# Patient Record
Sex: FEMALE | Race: WHITE | NOT HISPANIC OR LATINO | Employment: FULL TIME | URBAN - METROPOLITAN AREA
[De-identification: names, ages, dates, MRNs, and addresses within clinical notes are randomized per-mention and may not be internally consistent; named-entity substitution may affect disease eponyms.]

---

## 2018-03-01 ENCOUNTER — HOSPITAL ENCOUNTER (EMERGENCY)
Facility: OTHER | Age: 23
Discharge: HOME | End: 2018-03-01
Attending: EMERGENCY MEDICINE
Payer: COMMERCIAL

## 2018-03-01 VITALS
HEART RATE: 76 BPM | DIASTOLIC BLOOD PRESSURE: 69 MMHG | WEIGHT: 165 LBS | TEMPERATURE: 98 F | BODY MASS INDEX: 26.52 KG/M2 | RESPIRATION RATE: 18 BRPM | OXYGEN SATURATION: 100 % | SYSTOLIC BLOOD PRESSURE: 120 MMHG | HEIGHT: 66 IN

## 2018-03-01 DIAGNOSIS — S41.112A LACERATION OF LEFT UPPER EXTREMITY, INITIAL ENCOUNTER: Primary | ICD-10-CM

## 2018-03-01 PROCEDURE — 12002 RPR S/N/AX/GEN/TRNK2.6-7.5CM: CPT

## 2018-03-01 PROCEDURE — 25000003 PHARM REV CODE 250: Performed by: PHYSICIAN ASSISTANT

## 2018-03-01 PROCEDURE — 99283 EMERGENCY DEPT VISIT LOW MDM: CPT | Mod: 25

## 2018-03-01 PROCEDURE — 63600175 PHARM REV CODE 636 W HCPCS: Performed by: PHYSICIAN ASSISTANT

## 2018-03-01 PROCEDURE — 90715 TDAP VACCINE 7 YRS/> IM: CPT | Performed by: PHYSICIAN ASSISTANT

## 2018-03-01 PROCEDURE — 90471 IMMUNIZATION ADMIN: CPT | Performed by: PHYSICIAN ASSISTANT

## 2018-03-01 RX ORDER — LIDOCAINE HYDROCHLORIDE AND EPINEPHRINE 20; 10 MG/ML; UG/ML
10 INJECTION, SOLUTION INFILTRATION; PERINEURAL
Status: COMPLETED | OUTPATIENT
Start: 2018-03-01 | End: 2018-03-01

## 2018-03-01 RX ORDER — TRAZODONE HYDROCHLORIDE 100 MG/1
100 TABLET ORAL NIGHTLY
COMMUNITY

## 2018-03-01 RX ADMIN — LIDOCAINE HYDROCHLORIDE,EPINEPHRINE BITARTRATE 10 ML: 20; .01 INJECTION, SOLUTION INFILTRATION; PERINEURAL at 08:03

## 2018-03-01 RX ADMIN — BACITRACIN, NEOMYCIN, POLYMYXIN B 1 EACH: 400; 3.5; 5 OINTMENT TOPICAL at 08:03

## 2018-03-01 RX ADMIN — CLOSTRIDIUM TETANI TOXOID ANTIGEN (FORMALDEHYDE INACTIVATED), CORYNEBACTERIUM DIPHTHERIAE TOXOID ANTIGEN (FORMALDEHYDE INACTIVATED), BORDETELLA PERTUSSIS TOXOID ANTIGEN (GLUTARALDEHYDE INACTIVATED), BORDETELLA PERTUSSIS FILAMENTOUS HEMAGGLUTININ ANTIGEN (FORMALDEHYDE INACTIVATED), BORDETELLA PERTUSSIS PERTACTIN ANTIGEN, AND BORDETELLA PERTUSSIS FIMBRIAE 2/3 ANTIGEN 0.5 ML: 5; 2; 2.5; 5; 3; 5 INJECTION, SUSPENSION INTRAMUSCULAR at 08:03

## 2018-03-02 NOTE — ED PROVIDER NOTES
"Encounter Date: 3/1/2018       History     Chief Complaint   Patient presents with    Laceration     Pt is a cutter and usually cuts very shallow, but today accidentily cut to deep to left wrist, pt is tearfull and denies SI/HI and has an appt to see her therapist this weekend     Patient is a 22-year-old female presenting to the emergency department with complaints of laceration.  The patient reports that earlier this afternoon she had an episode of "cutting" when she used a razor blade to cut her left forearm.  She admits she has done this in the past, previously in November.  She states she used to have a long-standing history of this, but had been in remission previously for 3 years.  She states that she had to stay home from work today as she was sitting, and felt as though she got her at home.  She states that she broke a disposable razor.  She reports that she immediately needed that she went to deep and came to the emergency department.  She denies any suicidal or homicidal ideation.  She states that she used to use this in the past relief distress, reporting she has not been suicidal since her senior high school.  She states not plan to do this to harm herself seriously.  She admits that she is on trazodone for depression.  She states that she sees both a psychiatrist as well as a therapist, and has upcoming appointments on both Saturday and Sunday to see both of them.  She feels she can wait long enough to see them.  She reports she was alone, but has plans to stay with a friend until then.  Patient admits that she feels as though she relapsed due to increased stress in her life.  She denies any particular stressor.       The history is provided by the patient.     Review of patient's allergies indicates:  No Known Allergies  Past Medical History:   Diagnosis Date    Anxiety     Depression     IBS (irritable bowel syndrome)      Past Surgical History:   Procedure Laterality Date    colonoscopy      " GASTROSCOPY       No family history on file.  Social History   Substance Use Topics    Smoking status: Never Smoker    Smokeless tobacco: Never Used    Alcohol use Yes      Comment: occasionally     Review of Systems   Constitutional: Negative for activity change, appetite change, chills, fatigue and fever.   HENT: Negative for congestion, rhinorrhea and sore throat.    Eyes: Negative for photophobia and visual disturbance.   Respiratory: Negative for cough, shortness of breath and wheezing.    Cardiovascular: Negative for chest pain.   Gastrointestinal: Negative for abdominal pain, diarrhea, nausea and vomiting.   Genitourinary: Negative for dysuria, hematuria and urgency.   Musculoskeletal: Negative for back pain, myalgias and neck pain.   Skin: Positive for wound. Negative for color change.   Neurological: Negative for weakness and headaches.   Psychiatric/Behavioral: Negative for agitation and confusion.       Physical Exam     Initial Vitals [03/01/18 1856]   BP Pulse Resp Temp SpO2   (!) 141/98 (!) 120 18 98.6 °F (37 °C) 97 %      MAP       112.33         Physical Exam    Nursing note and vitals reviewed.  Constitutional: She appears well-developed and well-nourished. She is not diaphoretic. No distress.   HENT:   Head: Normocephalic and atraumatic.   Right Ear: External ear normal.   Left Ear: External ear normal.   Nose: Nose normal.   Mouth/Throat: Oropharynx is clear and moist.   Eyes: Conjunctivae and EOM are normal.   Neck: Normal range of motion. Neck supple.   Musculoskeletal: Normal range of motion.   Neurological: She is alert and oriented to person, place, and time.   Skin: Skin is warm.        Psychiatric: She has a normal mood and affect. Her behavior is normal. Judgment and thought content normal.   Well-groomed, appropriately dressed,  female.  She is speaking in clear and full sentences.  She denies suicidal or homicidal ideation.         ED Course   Lac Repair  Date/Time: 3/1/2018  10:03 PM  Performed by: DINESH SMALLS  Authorized by: LOURDES BRAR   Body area: upper extremity  Location details: left lower arm  Laceration length: 4 cm  Foreign bodies: no foreign bodies  Tendon involvement: none  Nerve involvement: none  Anesthesia: local infiltration    Anesthesia:  Local Anesthetic: lidocaine 2% with epinephrine  Anesthetic total: 1 mL  Preparation: Patient was prepped and draped in the usual sterile fashion.  Irrigation solution: saline  Irrigation method: syringe  Debridement: none  Degree of undermining: none  Skin closure: 4-0 nylon  Number of sutures: 5  Technique: simple  Approximation: close  Approximation difficulty: simple  Dressing: antibiotic ointment and 4x4 sterile gauze  Patient tolerance: Patient tolerated the procedure well with no immediate complications        Labs Reviewed   POCT URINE PREGNANCY             Medical Decision Making:   Initial Assessment:   Urgent evaluation of a 22-year-old female presenting with complaints of a self-inflicted laceration to her left forearm.  Patient is afebrile, nontoxic appearing, hemodynamically stable.  Physical exam reveals a 4 cm gaping laceration with no deep tissue or tendon involvement.  Will plan for wound care, laceration repair, and reassess.  I did have a likely discussion with the patient in regards to her mental health.  She admits she has a history of both depression and anxiety for which she is treated trazodone.  She adamantly denies suicidal or homicidal ideation.  She has good insight, stating she is remorseful for her actions and was scared with what she did.  She has arranged to spend the next few nights at a friend's house, and has close follow-up with both therapist and psychiatrist.  I do not feel that a PEC is warranted at this time.  Clinical Tests:   Lab Tests: Ordered and Reviewed  The following lab test(s) were unremarkable: UPT  ED Management:  Laceration was cleaned extensively in the emergency department  and repaired per procedure note, the patient tolerated this well.  At this time, do not feel that further testing or imaging is warranted.  Patient is going home to the care of her friend who will stay with her for the next few nights.  Patient states she feels safe at home.  No further thoughts of harming herself.  Counseled on symptomatic care and treatment including wound care and suture removal.  Stable for discharge. The patient was instructed to follow up with a primary care provider in 2 days or to return to the emergency department for worsening symptoms. The treatment plan was discussed with the patient who demonstrated understanding and comfort with plan. The patient's history, physical exam, and plan of care was discussed with and agreed upon with my supervising physician.    Other:   I have discussed this case with another health care provider.       <> Summary of the Discussion: Dr. Orellana  This note was created using Dragon Medical Dictation. There may be typographical errors secondary to dictation.                       Clinical Impression:     1. Laceration of left upper extremity, initial encounter       Disposition:   Disposition: Discharged  Condition: Stable                        Julisa Servin PA-C  03/01/18 2191

## 2018-03-02 NOTE — ED NOTES
"Pt states "I relapsed with my cutting and cut too deep". Pt denies SI. Reports taking trazodone for depression and has an appointment with her psychiatrist to discuss increasing the dosage. Transverse incision on left anterior forearm approximately 5 cm in length with edges not well approximated at center of cut.  "

## 2018-03-06 ENCOUNTER — NURSE TRIAGE (OUTPATIENT)
Dept: ADMINISTRATIVE | Facility: CLINIC | Age: 23
End: 2018-03-06

## 2018-03-06 ENCOUNTER — HOSPITAL ENCOUNTER (EMERGENCY)
Facility: OTHER | Age: 23
Discharge: HOME | End: 2018-03-06
Attending: EMERGENCY MEDICINE
Payer: COMMERCIAL

## 2018-03-06 VITALS
SYSTOLIC BLOOD PRESSURE: 109 MMHG | HEART RATE: 70 BPM | WEIGHT: 165 LBS | OXYGEN SATURATION: 99 % | HEIGHT: 65 IN | DIASTOLIC BLOOD PRESSURE: 61 MMHG | RESPIRATION RATE: 14 BRPM | BODY MASS INDEX: 27.49 KG/M2 | TEMPERATURE: 99 F

## 2018-03-06 DIAGNOSIS — Z51.89 VISIT FOR WOUND CHECK: Primary | ICD-10-CM

## 2018-03-06 PROCEDURE — 99281 EMR DPT VST MAYX REQ PHY/QHP: CPT

## 2018-03-06 RX ORDER — BUPROPION HYDROCHLORIDE 150 MG/1
150 TABLET, EXTENDED RELEASE ORAL 2 TIMES DAILY
COMMUNITY
End: 2018-04-04

## 2018-03-06 NOTE — ED PROVIDER NOTES
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 Pt told to have stitches removed 10-14 days after placement. Wound well approximated. No swelling discharge or redness to area     22-year-old female with anxiety and depression presents emergency department for wound check.  She states that she had a laceration to the left forearm that was sutured approximately 5 days ago.  She states that her work is requiring her to have a note stating that the wound needs to be covered.  She denies any complications, drainage, surrounding erythema, warmth, pain, fever or streaking.      The history is provided by the patient.     Review of patient's allergies indicates:  No Known Allergies  Past Medical History:   Diagnosis Date    Anxiety     Depression     IBS (irritable bowel syndrome)      Past Surgical History:   Procedure Laterality Date    colonoscopy      GASTROSCOPY       No family history on file.  Social History   Substance Use Topics    Smoking status: Never Smoker    Smokeless tobacco: Never Used    Alcohol use Yes      Comment: occasionally     Review of Systems   Constitutional: Negative for chills and fever.   HENT: Negative for sore throat.    Respiratory: Negative for shortness of breath.    Cardiovascular: Negative for chest pain.   Gastrointestinal: Negative for nausea and vomiting.   Genitourinary: Negative for dysuria.   Musculoskeletal: Negative for back pain.   Skin: Positive for wound. Negative for rash.   Neurological: Negative for weakness.   Hematological: Does not bruise/bleed easily.       Physical Exam     Initial Vitals [03/06/18 1614]   BP Pulse Resp Temp SpO2   109/61 70 14 98.8 °F (37.1 °C) 99 %      MAP       77         Physical Exam    Nursing note and vitals reviewed.  Constitutional: Vital signs are " normal. She appears well-developed and well-nourished.  Non-toxic appearance. No distress.   HENT:   Head: Normocephalic and atraumatic.   Right Ear: External ear normal.   Left Ear: External ear normal.   Nose: Nose normal.   Eyes: Conjunctivae and lids are normal. No scleral icterus.   Neck: Normal range of motion and phonation normal. Neck supple.   Abdominal: Soft. Normal appearance.   Musculoskeletal: Normal range of motion.   No obvious deformities, moving all extremities, normal gait   Neurological: She is alert and oriented to person, place, and time. She has normal strength. No sensory deficit.   Skin: Skin is warm and dry. Capillary refill takes less than 2 seconds. Laceration noted. No lesion and no rash noted. No erythema.        Approximate 4 cm laceration with sutures in place.  Healing well however still with wound dehiscence as it is not fully healed.  No surrounding erythema, induration, fluctuance, drainage or tenderness palpation   Psychiatric: She has a normal mood and affect. Her speech is normal and behavior is normal. Judgment normal. Cognition and memory are normal.         ED Course   Procedures  Labs Reviewed - No data to display          Medical Decision Making:   History:   Old Medical Records: I decided to obtain old medical records.  Initial Assessment:   22-year-old female with complaints consistent with laceration to the forearm here for wound check.  Vital signs stable, afebrile, neurovascularly intact.  She is alert, healthy and nontoxic appearing.  She is in no apparent distress.  No focal neurological deficits.  Exam documented above.  No evidence of serious bacterial infection, cellulitis or abscess.  Wound healing with sutures in place.  ED Management:  Patient discharged home with care instructions.  Given a note to return to work stating that she can work with bandage overlying the wound.  Given bandage material.  She is to follow-up as directed for suture removal in the next  5-7 days.  She is urged return sooner for any worsening symptoms.  She states understanding.  This patient was discussed with the attending physician who agrees with treatment plan.    Patient discharged and walked to the discharge window by me, Yesica Mancilla PA-C  Other:   I have discussed this case with another health care provider.       <> Summary of the Discussion: Jodi  This note was created using Dragon Medical dictation.  There may be typographical errors secondary to dictation.                        Clinical Impression:     1. Visit for wound check          Disposition:   Disposition: Discharged  Condition: Stable                        Yesica Mancilla PA-C  03/06/18 1737       Yesica Mancilla PA-C  03/06/18 4771

## 2018-03-06 NOTE — TELEPHONE ENCOUNTER
Reason for Disposition   General information question, no triage required and triager able to answer question    Protocols used: ST INFORMATION ONLY CALL-A-AH    Pt states that she needs a work note stating that it is ok for her to return to work but to keep sutures covered while at work. 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 Dressing recommended while at work. Pt transferred to ED that she was seen at to request work note.

## 2018-03-16 ENCOUNTER — HOSPITAL ENCOUNTER (EMERGENCY)
Facility: OTHER | Age: 23
Discharge: PSYCHIATRIC OR SUBSTANCE USE FACILITY | End: 2018-03-17
Attending: EMERGENCY MEDICINE
Payer: COMMERCIAL

## 2018-03-16 DIAGNOSIS — R45.851 SUICIDAL IDEATIONS: Primary | ICD-10-CM

## 2018-03-16 LAB
ALBUMIN SERPL BCP-MCNC: 3.8 G/DL
ALP SERPL-CCNC: 56 U/L
ALT SERPL W/O P-5'-P-CCNC: 50 U/L
AMPHET+METHAMPHET UR QL: NEGATIVE
ANION GAP SERPL CALC-SCNC: 10 MMOL/L
APAP SERPL-MCNC: <3 UG/ML
AST SERPL-CCNC: 21 U/L
B-HCG UR QL: NEGATIVE
BARBITURATES UR QL SCN>200 NG/ML: NEGATIVE
BASOPHILS # BLD AUTO: 0.08 K/UL
BASOPHILS NFR BLD: 1.2 %
BENZODIAZ UR QL SCN>200 NG/ML: NEGATIVE
BILIRUB SERPL-MCNC: 0.5 MG/DL
BILIRUB UR QL STRIP: NEGATIVE
BUN SERPL-MCNC: 10 MG/DL
BZE UR QL SCN: NEGATIVE
CALCIUM SERPL-MCNC: 9.4 MG/DL
CANNABINOIDS UR QL SCN: NEGATIVE
CHLORIDE SERPL-SCNC: 106 MMOL/L
CLARITY UR: ABNORMAL
CO2 SERPL-SCNC: 20 MMOL/L
COLOR UR: YELLOW
CREAT SERPL-MCNC: 0.8 MG/DL
CREAT UR-MCNC: 309.7 MG/DL
CTP QC/QA: YES
DIFFERENTIAL METHOD: NORMAL
EOSINOPHIL # BLD AUTO: 0.2 K/UL
EOSINOPHIL NFR BLD: 2.4 %
ERYTHROCYTE [DISTWIDTH] IN BLOOD BY AUTOMATED COUNT: 13.6 %
EST. GFR  (AFRICAN AMERICAN): >60 ML/MIN/1.73 M^2
EST. GFR  (NON AFRICAN AMERICAN): >60 ML/MIN/1.73 M^2
ETHANOL SERPL-MCNC: <10 MG/DL
GLUCOSE SERPL-MCNC: 87 MG/DL
GLUCOSE UR QL STRIP: NEGATIVE
HCT VFR BLD AUTO: 37.3 %
HGB BLD-MCNC: 12.5 G/DL
HGB UR QL STRIP: ABNORMAL
KETONES UR QL STRIP: NEGATIVE
LEUKOCYTE ESTERASE UR QL STRIP: NEGATIVE
LYMPHOCYTES # BLD AUTO: 2.5 K/UL
LYMPHOCYTES NFR BLD: 37.2 %
MCH RBC QN AUTO: 29 PG
MCHC RBC AUTO-ENTMCNC: 33.5 G/DL
MCV RBC AUTO: 87 FL
METHADONE UR QL SCN>300 NG/ML: NEGATIVE
MONOCYTES # BLD AUTO: 0.4 K/UL
MONOCYTES NFR BLD: 5.5 %
NEUTROPHILS # BLD AUTO: 3.5 K/UL
NEUTROPHILS NFR BLD: 53.5 %
NITRITE UR QL STRIP: NEGATIVE
OPIATES UR QL SCN: NEGATIVE
PCP UR QL SCN>25 NG/ML: NEGATIVE
PH UR STRIP: 6 [PH] (ref 5–8)
PLATELET # BLD AUTO: 299 K/UL
PMV BLD AUTO: 9.7 FL
POTASSIUM SERPL-SCNC: 4 MMOL/L
PROT SERPL-MCNC: 7 G/DL
PROT UR QL STRIP: NEGATIVE
RBC # BLD AUTO: 4.31 M/UL
SODIUM SERPL-SCNC: 136 MMOL/L
SP GR UR STRIP: >=1.03 (ref 1–1.03)
TOXICOLOGY INFORMATION: NORMAL
TSH SERPL DL<=0.005 MIU/L-ACNC: 1.49 UIU/ML
URN SPEC COLLECT METH UR: ABNORMAL
UROBILINOGEN UR STRIP-ACNC: NEGATIVE EU/DL
WBC # BLD AUTO: 6.58 K/UL

## 2018-03-16 PROCEDURE — 80053 COMPREHEN METABOLIC PANEL: CPT

## 2018-03-16 PROCEDURE — 80320 DRUG SCREEN QUANTALCOHOLS: CPT

## 2018-03-16 PROCEDURE — 81003 URINALYSIS AUTO W/O SCOPE: CPT

## 2018-03-16 PROCEDURE — 80329 ANALGESICS NON-OPIOID 1 OR 2: CPT

## 2018-03-16 PROCEDURE — 99285 EMERGENCY DEPT VISIT HI MDM: CPT | Mod: 25

## 2018-03-16 PROCEDURE — 84443 ASSAY THYROID STIM HORMONE: CPT

## 2018-03-16 PROCEDURE — 80307 DRUG TEST PRSMV CHEM ANLYZR: CPT

## 2018-03-16 PROCEDURE — 85025 COMPLETE CBC W/AUTO DIFF WBC: CPT

## 2018-03-16 PROCEDURE — 81025 URINE PREGNANCY TEST: CPT | Performed by: PHYSICIAN ASSISTANT

## 2018-03-16 RX ORDER — MULTIVITAMIN
1 TABLET ORAL DAILY
COMMUNITY

## 2018-03-16 RX ORDER — NORETHINDRONE ACETATE AND ETHINYL ESTRADIOL 1; 5 MG/1; UG/1
TABLET ORAL DAILY
COMMUNITY
End: 2018-04-04

## 2018-03-16 RX ORDER — CETIRIZINE HYDROCHLORIDE 10 MG/1
10 TABLET ORAL DAILY
COMMUNITY

## 2018-03-16 NOTE — ED NOTES
BSSR received from jose elias Singh. Pt sitting up in bed, respirations even/unlabored. NAD noted. Pt denies any current needs at this time. ED sitter Hermelinda at bedside for direct observation. PEC precautions maintained. Will continue to monitor.
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
## 2018-03-16 NOTE — ED NOTES
All patient belongings searched with RN and , labeled and secured with ED : TOTAL 4 BAGS  Pink and black colored travel suitcase containing:    Black colored bra  Blue colored shirt  Pair brown colored boots  Green colored water bottle/empty  Pink colored brush  2 lip gloss  Pink colored flash light  Black colored sweatshirt  Zyrtec bottle  Pink colored bra  Black colored pants  Pink colored bra  Numerous T shirts  Multi vitamin bottle  Multi colored bag    Patient secured bag w/ security: X2 bags  Pink colored wallet  pateint ID  Insurance card  Debit card  Safety pins  2 white colored phone chargers  Silver colored laptop  [employer]   Black colored cell phone- turned off

## 2018-03-16 NOTE — ED NOTES
Two patient identifiers have been checked and are correct.      Appearance: Pt awake, alert & oriented to person, place & time. Pt in no acute distress at present time. Pt is clean and well groomed with clothes appropriately fastened.   Skin: Skin warm, dry & intact. Color consistent with ethnicity. Mucous membranes moist. No breakdown or brusing noted. x4 sutures noted to left anterior wrist, no pain, swelling, redness or irritation noted to site.   Musculoskeletal: Patient moving all extremities well, no obvious swelling or deformities noted.   Respiratory: Respirations spontaneous, even, and non-labored. Visible chest rise noted. Airway is open and patent. No accessory muscle use noted.   Neurologic: Sensation is intact. Speech is clear and appropriate. Eyes open spontaneously, behavior appropriate to situation, follows commands, facial expression symmetrical, bilateral hand grasp equal and even, purposeful motor response noted.  Cardiac: All peripheral pulses present. No Bilateral lower extremity edema. Cap refill is <3 seconds. Denies active chest pains, SOB, dizziness, blurred vision, weakness or fatigue at this time.   Abdomen: Abdomen soft, non-tender to palpation. Patient denies abd pains no N/V/D at this time.   : Pt reports no dysuria or hematuria.
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
## 2018-03-16 NOTE — ED NOTES
Patient placed in paper scrubs and all cords and wires have been removed. The patient has signed their mental health rights and they have been placed in the chart. All the patient's belongings have been removed, labeled and searched with security.  The PEC has been completed, signed, dated and placed in the patient's chart. There is a sitter, Brandi, at the bedside with direct visual contact doing 15 minute observations and they have no belongings in the room. If family/vistors are present, they have also been made aware of the no belongings policy and have stated their understanding. The SADD tool was done on intake. The transfer sheet should be signed upon the actual transfer. Vital signs are being done per orders.

## 2018-03-17 VITALS
TEMPERATURE: 99 F | SYSTOLIC BLOOD PRESSURE: 115 MMHG | HEIGHT: 65 IN | HEART RATE: 61 BPM | OXYGEN SATURATION: 97 % | RESPIRATION RATE: 18 BRPM | DIASTOLIC BLOOD PRESSURE: 68 MMHG | WEIGHT: 167 LBS | BODY MASS INDEX: 27.82 KG/M2

## 2018-03-17 NOTE — ED NOTES
PT RESTING IN BED COMFORTABLY AT THIS TIME EYES CLOSED AND RESPIRATIONS EVEN AND UNLABORED, BATHROOM NEEDS ADDRESSED AND COMFORT SITUATED. PEC IN PLACE AND ONE TO ONE DIRECT OBSERVATION MAINTAINED WITH Maurice YAO AT THIS TIME. PT MEDICALLY CLEARED. Pt is being accepted to [facility] . Waiting for SPD to arrive

## 2018-03-17 NOTE — ED NOTES
PT RESTING IN BED COMFORTABLY AT THIS TIME EYES CLOSED AND RESPIRATIONS EVEN AND UNLABORED, BATHROOM NEEDS ADDRESSED AND COMFORT SITUATED. PEC IN PLACE AND ONE TO ONE DIRECT OBSERVATION MAINTAINED WITH Maurice YAO AT THIS TIME. PT MEDICALLY CLEARED. WILL CONTINUE TO MONITOR AND BE AVAILABLE FOR PT NEEDS.

## 2018-03-17 NOTE — ED NOTES
PT RESTING IN BED COMFORTABLY AT THIS TIME EYES CLOSED AND RESPIRATIONS EVEN AND UNLABORED, BATHROOM NEEDS ADDRESSED AND COMFORT SITUATED. PEC IN PLACE AND ONE TO ONE DIRECT OBSERVATION MAINTAINED WITH Maurice YAO AT THIS TIME. PT MEDICALLY CLEARED. Pt is being accepted to [facility]